# Patient Record
(demographics unavailable — no encounter records)

---

## 2025-05-19 NOTE — HISTORY OF PRESENT ILLNESS
[TextBox_4] : 21yo presents to yousuf IUD has kyleena IUD since 1/6/2023 studied abroad in [place] in the fall, had a lot of pain, headaches, hair loss, has gained 15lb since January, tired. Has appt with PCP in 2 weeks Pain - was tracking cycles - pain is not always during ovulation but is more on the left side, sharper than usual, relief with naproxen.  Has not been sexually active in the last year

## 2025-05-19 NOTE — DISCUSSION/SUMMARY
[FreeTextEntry1] : 19yo with kyleena IUD x >2 years with new complaints will check tsh TVS urine for ch/gc  recommend f/u with PCP  if no explanation can always consider removal of kyleena to see if symptoms improve

## 2025-06-04 NOTE — HISTORY OF PRESENT ILLNESS
[Lower back] : lower back [de-identified] : 06/04/2025:  20-year-old female presents with complaints of Right sided low back pain and spasm. Patient is on the Row team in college, reports her symptoms are aggravated after performing sport- she ROWs on Rt side. She denies radicular pain, n/t. She was evaluated by team doctor at school, began course of PT [facility] school  and now performing HEP with short term relief. She reports her symptoms recently have lessened since season completed. Season begins again in September 2025.  No PmHx, NKDA [place] -on Row team [place]

## 2025-06-04 NOTE — IMAGING
[de-identified] : X-ray Ap/Lateral/Flexion/Extension of lumbar spine were viewed and interpreted.  Normal alignment is maintained. no instability on flexion or extension views .  slight l4-5 retrolisthesis

## 2025-06-04 NOTE — ASSESSMENT
[FreeTextEntry1] : 20 year old female with low back pain failed PT with ATC/PT at school.  rows crew.  Tried OTC NSAIDS  c/w HEP Will obtain MRI of lumbar spine for further evaluation  FU after MRI

## 2025-07-07 NOTE — ASSESSMENT
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

## 2025-07-07 NOTE — IMAGING
[de-identified] : X-ray Ap/Lateral/Flexion/Extension of lumbar spine were viewed and interpreted.  Normal alignment is maintained. no instability on flexion or extension views .  slight l4-5 retrolisthesis

## 2025-07-07 NOTE — HISTORY OF PRESENT ILLNESS
[Lower back] : lower back [] : yes [de-identified] : 7/7/25: Here to f/u lower back pain and review MRI L spine.   06/04/2025:  20-year-old female presents with complaints of Right sided low back pain and spasm. Patient is on the Row team in college, reports her symptoms are aggravated after performing sport- she ROWs on Rt side. She denies radicular pain, n/t. She was evaluated by team doctor at school, began course of PT [facility] school  and now performing HEP with short term relief. She reports her symptoms recently have lessened since season completed. Season begins again in September 2025.  No PmHx, NKDA [place] -on Row team [place]

## 2025-07-07 NOTE — DATA REVIEWED
[FreeTextEntry1] : MRI Lumbar spine ([facility] 6/29/25) Impression: IMPRESSION: 1. Mild lumbar levoscoliosis. 2. At L3-4: Asymmetric disc bulge to the left mildly narrowing the inferior aspect of the left neural foramen. 3. At L4-5: Asymmetric disc bulge to the left mildly narrowing the inferior aspect of the left neural foramen.

## 2025-07-17 NOTE — PHYSICAL EXAM
[MA] : MA [FreeTextEntry2] : Johnathan [Appropriately responsive] : appropriately responsive [Alert] : alert [No Acute Distress] : no acute distress [Soft] : soft [Non-tender] : non-tender [Non-distended] : non-distended [Oriented x3] : oriented x3 [Examination Of The Breasts] : a normal appearance [No Masses] : no breast masses were palpable [Labia Majora] : normal [Labia Minora] : normal [IUD String] : an IUD string was noted [Normal] : normal [Uterine Adnexae] : normal

## 2025-07-17 NOTE — HISTORY OF PRESENT ILLNESS
[TextBox_4] : 19yo presents for annuale gabriela has kyleena IUD since 1/6/2023 reports some llq pain - but not as bad - had sono , cyst is gone other symptms have improved since last visit